# Patient Record
Sex: MALE | HISPANIC OR LATINO | URBAN - METROPOLITAN AREA
[De-identification: names, ages, dates, MRNs, and addresses within clinical notes are randomized per-mention and may not be internally consistent; named-entity substitution may affect disease eponyms.]

---

## 2020-08-21 ENCOUNTER — OFFICE (OUTPATIENT)
Dept: URBAN - METROPOLITAN AREA CLINIC 11 | Facility: CLINIC | Age: 60
End: 2020-08-21

## 2020-08-21 VITALS — WEIGHT: 186 LBS | HEIGHT: 69 IN

## 2020-08-21 DIAGNOSIS — K21.9 GASTROESOPHAGEAL REFLUX DISEASE: ICD-10-CM

## 2020-08-21 PROCEDURE — 99203 OFFICE O/P NEW LOW 30 MIN: CPT | Performed by: INTERNAL MEDICINE

## 2020-08-21 NOTE — SERVICEHPINOTES
New to us, referred for heartburn, wanted to come in in personBRWas told to stop his omeprazole and try to handle his acid reflux with diet but immediately got bad symptoms again, tried a few times but can't NOT take it so is back on it now a couple of weeks, just 20mg just before am meal.   Prone to problems 6 to 8 years or so, started omeprazole each am with very good effect otherwise after most any meal or just  am coffee gets bad upset feeling at xyphoid region and upward spreading burning substernal   pain, sometimes regurgitation.  Initially took Tums which was enough for couple of years but worsened and now by itself  barely helpful, brief relief. During night doesn't have much problem if avoids spicy food late, which he learned to do.  Mornings when wakes up and usually after breakfast is  ok without med but by right after lunch gets bad distress.  No dysphagia no nausea, vomiting, appetite is ok  Weight stable now, had been heavier but dieted some off weight 180 seems optimalBRNot clear why told not to take the omeprazole and easily reassured about its safetyBRDid apparently have EGD 5 years ago or so when had colonoscopy, in [place] someplace, has some record at home he'll get me colon then he thinks is negative for polyps but then adds maybe they saw something they didn't take out...not told when to repeat.BRQuit cigs 30 years agoBRAlcohol usually 2 glasses wine long time, glasses seem normal 4-5 oz sizeBRBlood work ok, glucose a little high , no meds for it

## 2020-09-07 VITALS
TEMPERATURE: 97.1 F | SYSTOLIC BLOOD PRESSURE: 131 MMHG | DIASTOLIC BLOOD PRESSURE: 83 MMHG | RESPIRATION RATE: 15 BRPM | HEIGHT: 69 IN | OXYGEN SATURATION: 99 % | HEART RATE: 96 BPM | WEIGHT: 189 LBS

## 2020-09-09 ENCOUNTER — AMBULATORY SURGICAL CENTER (OUTPATIENT)
Dept: URBAN - METROPOLITAN AREA SURGERY 8 | Facility: SURGERY | Age: 60
End: 2020-09-09

## 2020-09-09 DIAGNOSIS — K21.9 GASTRO-ESOPHAGEAL REFLUX DISEASE WITHOUT ESOPHAGITIS: ICD-10-CM

## 2020-09-09 PROCEDURE — 43235 EGD DIAGNOSTIC BRUSH WASH: CPT | Performed by: INTERNAL MEDICINE

## 2020-09-09 PROCEDURE — 99152 MOD SED SAME PHYS/QHP 5/>YRS: CPT | Performed by: INTERNAL MEDICINE

## 2020-09-09 NOTE — SERVICEHPINOTES
Was told to stop his omeprazole and try to handle his acid reflux with diet but immediately got bad symptoms again, tried a few times but can't NOT take it so is back on it now a couple of weeks, just 20mg just before am meal. Prone to problems 6 to 8 years or so, started omeprazole each am with very good effect otherwise after most any meal or just am coffee gets bad upset feeling at xyphoid region and upward spreading burning substernal pain, sometimes regurgitation. Initially took Tums which was enough for couple of years but worsened and now by itself barely helpful, brief relief. During night doesn't have much problem if avoids spicy food late, which he learned to do. Mornings when wakes up and usually after breakfast is ok without med but by right after lunch gets bad distress. No dysphagia no nausea, vomiting, appetite is ok Weight stable now, had been heavier but dieted some off weight 180 seems optimalBRDid apparently have EGD 5 years ago or so when had colonoscopy, in [place] someplace, has some record at home he'll get me colon then he thinks is negative for polyps but then adds maybe they saw something they didn't take out...not told when to repeat.BRQuit cigs 30 years agoBRAlcohol usually 2 glasses wine long time, glasses seem normal 4-5 oz sizeBRBlood work ok, glucose a little high , no meds for itBRtrying to get info on colonoscopy he hadBR

## 2021-09-06 ENCOUNTER — NURSE TRIAGE (OUTPATIENT)
Dept: TELEHEALTH | Age: 61
End: 2021-09-06